# Patient Record
Sex: FEMALE | Race: WHITE | NOT HISPANIC OR LATINO | URBAN - METROPOLITAN AREA
[De-identification: names, ages, dates, MRNs, and addresses within clinical notes are randomized per-mention and may not be internally consistent; named-entity substitution may affect disease eponyms.]

---

## 2022-05-23 ENCOUNTER — OFFICE (OUTPATIENT)
Dept: URBAN - METROPOLITAN AREA CLINIC 34 | Facility: CLINIC | Age: 32
End: 2022-05-23

## 2022-05-23 VITALS
SYSTOLIC BLOOD PRESSURE: 155 MMHG | TEMPERATURE: 97.7 F | HEIGHT: 58 IN | DIASTOLIC BLOOD PRESSURE: 94 MMHG | WEIGHT: 104 LBS | HEART RATE: 88 BPM

## 2022-05-23 DIAGNOSIS — K21.9 GASTRO-ESOPHAGEAL REFLUX DISEASE WITHOUT ESOPHAGITIS: ICD-10-CM

## 2022-05-23 DIAGNOSIS — R11.2 NAUSEA WITH VOMITING, UNSPECIFIED: ICD-10-CM

## 2022-05-23 PROCEDURE — 99204 OFFICE O/P NEW MOD 45 MIN: CPT

## 2022-05-23 NOTE — SERVICEHPINOTES
33 yo female present for nausea and vomiting referred by Ap Oquendo MD. 
br

She reports that she has been nausea and vomiting since late January 2022. Her N/V comes and goes and stays a few days.  Reports vomiting 4 to 6 times a day and started in the morning and throughout the day. She missed several times work. No abd pain or epigastric pain. 
br 
brHas a bitter taste. Has a dry cough and throat clearing. Sometimes + regurgitation.  No voice changes or hoarseness. No dysphagia or odynophagia. No alcohol drink or smoke. Stopped NSAID. Her PCP prescribed Protonix 40 mg which helps a little but still vomiting. 
br 
brHad a US, abd on 03/10/22 unremarkable. [place] Upper [facility], 04/04/22 +Gastroesophageal reflux. br No family hx of upper GI issues. Never done EGD. No heart or lung issues. br
br
br

## 2022-06-16 ENCOUNTER — OFFICE (OUTPATIENT)
Dept: URBAN - METROPOLITAN AREA CLINIC 30 | Facility: CLINIC | Age: 32
End: 2022-06-16
Payer: COMMERCIAL

## 2022-06-16 ENCOUNTER — OFFICE (OUTPATIENT)
Dept: URBAN - METROPOLITAN AREA PATHOLOGY 18 | Facility: PATHOLOGY | Age: 32
End: 2022-06-16

## 2022-06-16 VITALS
DIASTOLIC BLOOD PRESSURE: 95 MMHG | SYSTOLIC BLOOD PRESSURE: 118 MMHG | OXYGEN SATURATION: 100 % | HEART RATE: 86 BPM | OXYGEN SATURATION: 99 % | DIASTOLIC BLOOD PRESSURE: 91 MMHG | SYSTOLIC BLOOD PRESSURE: 117 MMHG | SYSTOLIC BLOOD PRESSURE: 136 MMHG | WEIGHT: 104 LBS | HEART RATE: 92 BPM | OXYGEN SATURATION: 97 % | TEMPERATURE: 97.5 F | DIASTOLIC BLOOD PRESSURE: 76 MMHG | DIASTOLIC BLOOD PRESSURE: 78 MMHG | RESPIRATION RATE: 18 BRPM | HEART RATE: 80 BPM | TEMPERATURE: 98.1 F | HEART RATE: 95 BPM | RESPIRATION RATE: 16 BRPM | SYSTOLIC BLOOD PRESSURE: 141 MMHG | RESPIRATION RATE: 17 BRPM | HEIGHT: 58 IN | HEART RATE: 87 BPM | HEART RATE: 88 BPM

## 2022-06-16 DIAGNOSIS — K44.9 DIAPHRAGMATIC HERNIA WITHOUT OBSTRUCTION OR GANGRENE: ICD-10-CM

## 2022-06-16 DIAGNOSIS — K22.2 ESOPHAGEAL OBSTRUCTION: ICD-10-CM

## 2022-06-16 DIAGNOSIS — R11.2 NAUSEA WITH VOMITING, UNSPECIFIED: ICD-10-CM

## 2022-06-16 DIAGNOSIS — K29.60 OTHER GASTRITIS WITHOUT BLEEDING: ICD-10-CM

## 2022-06-16 DIAGNOSIS — K22.89 OTHER SPECIFIED DISEASE OF ESOPHAGUS: ICD-10-CM

## 2022-06-16 PROCEDURE — 88305 TISSUE EXAM BY PATHOLOGIST: CPT | Performed by: PATHOLOGY

## 2022-06-16 PROCEDURE — 88342 IMHCHEM/IMCYTCHM 1ST ANTB: CPT | Performed by: PATHOLOGY

## 2022-06-16 PROCEDURE — 88312 SPECIAL STAINS GROUP 1: CPT | Performed by: PATHOLOGY

## 2022-06-16 PROCEDURE — 88313 SPECIAL STAINS GROUP 2: CPT | Performed by: PATHOLOGY

## 2022-09-12 ENCOUNTER — TELEHEALTH PROVIDED OTHER THAN IN PATIENT'S HOME (OUTPATIENT)
Dept: URBAN - METROPOLITAN AREA TELEHEALTH 7 | Facility: TELEHEALTH | Age: 32
End: 2022-09-12

## 2022-09-12 VITALS — WEIGHT: 99 LBS | HEIGHT: 58 IN

## 2022-09-12 DIAGNOSIS — R11.2 NAUSEA WITH VOMITING, UNSPECIFIED: ICD-10-CM

## 2022-09-12 DIAGNOSIS — K44.9 DIAPHRAGMATIC HERNIA WITHOUT OBSTRUCTION OR GANGRENE: ICD-10-CM

## 2022-09-12 DIAGNOSIS — K22.2 ESOPHAGEAL OBSTRUCTION: ICD-10-CM

## 2022-09-12 DIAGNOSIS — K21.9 GASTRO-ESOPHAGEAL REFLUX DISEASE WITHOUT ESOPHAGITIS: ICD-10-CM

## 2022-09-12 PROCEDURE — 99214 OFFICE O/P EST MOD 30 MIN: CPT | Mod: 95

## 2022-09-12 NOTE — SERVICEHPINOTES
PATIENT VERIFIED BY DATE OF BIRTH AND NAME. Patient has been consented for this telecommunication visit.
br

br 
31 y/o female following up for nausea and vomiting.
br5/23/22 Eval'd by Topher Mckeon NP: nausea and vomiting since late January 2022. Her N/V comes and goes and stays a few days. Reports vomiting 4 to 6 times a day. No abd pain. Has a bitter taste. +dry cough and throat clearing. Sometimes + regurgitation. No alcohol drink or smoke. Stopped NSAID. Her PCP prescribed Protonix 40 mg which helps a little but still vomiting.br--3/20/22 abd U/S: unremarkable. 
br --4/4/22 [place] [facility]: +Gastroesophageal reflux.
br
br --6/16/22 EGD: +hiatal hernia and ring in ge junction (dilated). distal esophagus bx cw reflux. proximal esophagus, stomach and duodenum bx unremarkable.
br
br Today she states sx remain unchanged. She is taking PPI in the morning and OTC H2RB in the evenings every day. Still having episodes of intermittent vomiting and reflux almost daily. Feels like she is "sensitive to almost everything". She is avoiding reflux triggers as best as she can. Some meds she assoc w vomiting: Xanax (she tried once then d/c) and Tylenol. Admits to daily consumption of cannabinoid edibles, unsure of quantity in mg or grams. States she used to smoke it but then switched to edibles. Feels like this helps her symptoms in the moment, makes her hungry, she is able to eat but is aware it may actually be worsening the overall situation. She has lost about 6 lbs since last visit, states her weight fluctuates all the time. +Low appetite.br visited="true"

## 2022-10-17 ENCOUNTER — TELEHEALTH PROVIDED OTHER THAN IN PATIENT'S HOME (OUTPATIENT)
Dept: URBAN - METROPOLITAN AREA TELEHEALTH 12 | Facility: TELEHEALTH | Age: 32
End: 2022-10-17

## 2022-10-17 VITALS — WEIGHT: 96 LBS | HEIGHT: 58 IN

## 2022-10-17 DIAGNOSIS — R11.2 NAUSEA WITH VOMITING, UNSPECIFIED: ICD-10-CM

## 2022-10-17 DIAGNOSIS — K22.2 ESOPHAGEAL OBSTRUCTION: ICD-10-CM

## 2022-10-17 DIAGNOSIS — K44.9 DIAPHRAGMATIC HERNIA WITHOUT OBSTRUCTION OR GANGRENE: ICD-10-CM

## 2022-10-17 DIAGNOSIS — K21.9 GASTRO-ESOPHAGEAL REFLUX DISEASE WITHOUT ESOPHAGITIS: ICD-10-CM

## 2022-10-17 PROCEDURE — 99214 OFFICE O/P EST MOD 30 MIN: CPT | Mod: 95

## 2022-10-17 NOTE — SERVICEHPINOTES
PATIENT VERIFIED BY DATE OF BIRTH AND NAME. Patient has been consented for this telecommunication visit.31 y/o female following up for nausea and vomiting.br5/23/22 Eval'd by Topher Mckeon NP: nausea and vomiting since late January 2022. Her N/V comes and goes and stays a few days. Reports vomiting 4 to 6 times a day. No abd pain. Has a bitter taste. +dry cough and throat clearing. Sometimes + regurgitation. No alcohol drink or smoke. Stopped NSAID. Her PCP prescribed Protonix 40 mg which helps a little but still vomiting.br--3/20/22 abd U/S: unremarkable.br--4/4/22 [place] [facility]: +Gastroesophageal reflux.--6/16/22 EGD: +hiatal hernia and ring in ge junction (dilated). distal esophagus bx cw reflux. proximal esophagus, stomach and duodenum bx unremarkable.9/12/22 Follow up: sx remain unchanged. She is taking PPI in the morning and OTC H2RB in the evenings every day. Still having episodes of intermittent vomiting and reflux almost daily. Feels like she is "sensitive to almost everything". She is avoiding reflux triggers as best as she can. Some meds she assoc w vomiting: Xanax (she tried once then d/c) and Tylenol. Admits to daily consumption of cannabinoid edibles, unsure of quantity in mg or grams. States she used to smoke it but then switched to edibles. Feels like this helps her symptoms in the moment, makes her hungry, she is able to eat but is aware it may actually be worsening the overall situation. She has lost about 6 lbs since last visit, states her weight fluctuates all the time. +Low appetite.
br
br 
--10/14/22 GES normal gastric emptying.
br
br Today she reports very mild improvement with being on "low histamine diet" as instructed by nutritionist. N/v definitely decreased. Reflux still there but not as severe. Has continued Protonix. Nutritionist suggested that perhaps her issue is "lack of stomach acid" instead. Was advised Betaine Hcl Pepsin but wanted to check in and see if OK to discontinue PPI. Stopped edibles for full week and felt unwell, now using just on and off as these help appetite.br
Cory 10 point review of systems have been reviewed as per HPI and otherwise negative.

## 2023-07-26 ENCOUNTER — TELEHEALTH PROVIDED IN PATIENT'S HOME (OUTPATIENT)
Dept: URBAN - METROPOLITAN AREA TELEHEALTH 3 | Facility: TELEHEALTH | Age: 33
End: 2023-07-26

## 2023-07-26 VITALS — HEIGHT: 58 IN | WEIGHT: 98 LBS

## 2023-07-26 DIAGNOSIS — R11.2 NAUSEA WITH VOMITING, UNSPECIFIED: ICD-10-CM

## 2023-07-26 DIAGNOSIS — K22.2 ESOPHAGEAL OBSTRUCTION: ICD-10-CM

## 2023-07-26 DIAGNOSIS — R13.10 DYSPHAGIA, UNSPECIFIED: ICD-10-CM

## 2023-07-26 DIAGNOSIS — K21.9 GASTRO-ESOPHAGEAL REFLUX DISEASE WITHOUT ESOPHAGITIS: ICD-10-CM

## 2023-07-26 DIAGNOSIS — R22.1 LOCALIZED SWELLING, MASS AND LUMP, NECK: ICD-10-CM

## 2023-07-26 PROCEDURE — 99214 OFFICE O/P EST MOD 30 MIN: CPT | Mod: 95 | Performed by: INTERNAL MEDICINE

## 2023-07-26 NOTE — SERVICEHPINOTES
PATIENT VERIFIED BY DATE OF BIRTH AND NAME. Patient has been consented for this telecommunication visit.   31 yo WF last seen last year with n/v feels that she is doing better. She has seen a nutritionist and was told fatuma JACOBS and was prescribed antimicrobials, but she states mostly supplements like oil of oregano. She was on bland diet and is introducing other foods. She was told possible low stomach acid and feels better off since stopping Protonix. She feels lump in her throat and can regurgitate. SHe can have trouble with pills or popcorm. She had EGD last year and had an esophageal ring that was dilated.

## 2023-07-26 NOTE — SERVICENOTES
Patient's visit was conducted through [employer] video telecommunication. Patient consented before the start of visit as to understanding of privacy concerns, possible technological failure, and their responsibility of carrying out instructions of plan.

## 2023-07-31 ENCOUNTER — OFFICE (OUTPATIENT)
Dept: URBAN - METROPOLITAN AREA CLINIC 30 | Facility: CLINIC | Age: 33
End: 2023-07-31

## 2023-07-31 ENCOUNTER — OFFICE (OUTPATIENT)
Dept: URBAN - METROPOLITAN AREA PATHOLOGY 18 | Facility: PATHOLOGY | Age: 33
End: 2023-07-31

## 2023-07-31 VITALS
HEART RATE: 112 BPM | DIASTOLIC BLOOD PRESSURE: 93 MMHG | TEMPERATURE: 97.8 F | TEMPERATURE: 97.9 F | DIASTOLIC BLOOD PRESSURE: 82 MMHG | RESPIRATION RATE: 12 BRPM | DIASTOLIC BLOOD PRESSURE: 74 MMHG | DIASTOLIC BLOOD PRESSURE: 83 MMHG | HEART RATE: 115 BPM | HEART RATE: 98 BPM | RESPIRATION RATE: 18 BRPM | WEIGHT: 98 LBS | SYSTOLIC BLOOD PRESSURE: 124 MMHG | DIASTOLIC BLOOD PRESSURE: 85 MMHG | HEART RATE: 109 BPM | RESPIRATION RATE: 17 BRPM | HEIGHT: 58 IN | OXYGEN SATURATION: 100 % | OXYGEN SATURATION: 99 % | SYSTOLIC BLOOD PRESSURE: 113 MMHG | SYSTOLIC BLOOD PRESSURE: 149 MMHG | SYSTOLIC BLOOD PRESSURE: 154 MMHG | SYSTOLIC BLOOD PRESSURE: 118 MMHG | RESPIRATION RATE: 16 BRPM | HEART RATE: 87 BPM

## 2023-07-31 DIAGNOSIS — K44.9 DIAPHRAGMATIC HERNIA WITHOUT OBSTRUCTION OR GANGRENE: ICD-10-CM

## 2023-07-31 DIAGNOSIS — R19.8 OTHER SPECIFIED SYMPTOMS AND SIGNS INVOLVING THE DIGESTIVE S: ICD-10-CM

## 2023-07-31 DIAGNOSIS — R13.10 DYSPHAGIA, UNSPECIFIED: ICD-10-CM

## 2023-07-31 DIAGNOSIS — K29.50 UNSPECIFIED CHRONIC GASTRITIS WITHOUT BLEEDING: ICD-10-CM

## 2023-07-31 PROBLEM — K31.89 OTHER DISEASES OF STOMACH AND DUODENUM: Status: ACTIVE | Noted: 2023-07-31

## 2023-07-31 PROCEDURE — 88313 SPECIAL STAINS GROUP 2: CPT | Performed by: PATHOLOGY

## 2023-07-31 PROCEDURE — 88305 TISSUE EXAM BY PATHOLOGIST: CPT | Performed by: PATHOLOGY

## 2023-07-31 PROCEDURE — 88342 IMHCHEM/IMCYTCHM 1ST ANTB: CPT | Performed by: PATHOLOGY

## 2023-09-06 ENCOUNTER — TELEHEALTH PROVIDED OTHER THAN IN PATIENT'S HOME (OUTPATIENT)
Dept: URBAN - METROPOLITAN AREA TELEHEALTH 3 | Facility: TELEHEALTH | Age: 33
End: 2023-09-06

## 2023-09-06 VITALS — HEIGHT: 58 IN | WEIGHT: 98 LBS

## 2023-09-06 DIAGNOSIS — K21.9 GASTRO-ESOPHAGEAL REFLUX DISEASE WITHOUT ESOPHAGITIS: ICD-10-CM

## 2023-09-06 DIAGNOSIS — R11.2 NAUSEA WITH VOMITING, UNSPECIFIED: ICD-10-CM

## 2023-09-06 DIAGNOSIS — K44.9 DIAPHRAGMATIC HERNIA WITHOUT OBSTRUCTION OR GANGRENE: ICD-10-CM

## 2023-09-06 PROCEDURE — 99213 OFFICE O/P EST LOW 20 MIN: CPT | Mod: 95 | Performed by: INTERNAL MEDICINE

## 2023-09-06 NOTE — SERVICEHPINOTES
PATIENT VERIFIED BY DATE OF BIRTH AND NAME. Patient has been consented for this telecommunication visit.   34 yo WF underwent EGD last month for evaluation of GERD and dysphagia which showed hiatal hernia and gastritis. She feels better now and states the dysphagia has dissipated. She feels occ short of breath that she attributes to hiatal hernia and eating a big meal the day before. She is eating ok, denies heartburn  She is on a GI select that is a gut repair supplement that she gets from her nutrtionist. She denies nausea.

## 2023-09-06 NOTE — SERVICENOTES
Patient's visit was conducted through [employer] video telecommunication. Patient consented before the start of visit as to understanding of privacy concerns, possible technological failure, and their responsibility of carrying out instructions of plan.